# Patient Record
Sex: FEMALE | Race: WHITE | Employment: OTHER | URBAN - METROPOLITAN AREA
[De-identification: names, ages, dates, MRNs, and addresses within clinical notes are randomized per-mention and may not be internally consistent; named-entity substitution may affect disease eponyms.]

---

## 2017-03-22 ENCOUNTER — TRANSFERRED RECORDS (OUTPATIENT)
Dept: HEALTH INFORMATION MANAGEMENT | Facility: CLINIC | Age: 77
End: 2017-03-22

## 2017-03-30 ENCOUNTER — TRANSFERRED RECORDS (OUTPATIENT)
Dept: HEALTH INFORMATION MANAGEMENT | Facility: CLINIC | Age: 77
End: 2017-03-30

## 2017-06-01 ENCOUNTER — RADIANT APPOINTMENT (OUTPATIENT)
Dept: MAMMOGRAPHY | Facility: CLINIC | Age: 77
End: 2017-06-01
Payer: COMMERCIAL

## 2017-06-01 ENCOUNTER — OFFICE VISIT (OUTPATIENT)
Dept: OBGYN | Facility: CLINIC | Age: 77
End: 2017-06-01
Payer: COMMERCIAL

## 2017-06-01 VITALS
BODY MASS INDEX: 30.61 KG/M2 | SYSTOLIC BLOOD PRESSURE: 122 MMHG | DIASTOLIC BLOOD PRESSURE: 78 MMHG | WEIGHT: 195 LBS | HEIGHT: 67 IN

## 2017-06-01 DIAGNOSIS — Z01.419 ENCOUNTER FOR GYNECOLOGICAL EXAMINATION WITHOUT ABNORMAL FINDING: Primary | ICD-10-CM

## 2017-06-01 DIAGNOSIS — I82.4Y2 ACUTE DEEP VEIN THROMBOSIS (DVT) OF PROXIMAL VEIN OF LEFT LOWER EXTREMITY (H): ICD-10-CM

## 2017-06-01 DIAGNOSIS — Z85.3 PERSONAL HISTORY OF BREAST CANCER: ICD-10-CM

## 2017-06-01 DIAGNOSIS — Z12.31 VISIT FOR SCREENING MAMMOGRAM: ICD-10-CM

## 2017-06-01 DIAGNOSIS — N39.0 RECURRENT UTI: ICD-10-CM

## 2017-06-01 PROCEDURE — G0202 SCR MAMMO BI INCL CAD: HCPCS | Mod: TC

## 2017-06-01 PROCEDURE — 99397 PER PM REEVAL EST PAT 65+ YR: CPT | Performed by: OBSTETRICS & GYNECOLOGY

## 2017-06-01 PROCEDURE — 77063 BREAST TOMOSYNTHESIS BI: CPT | Mod: TC

## 2017-06-01 ASSESSMENT — PATIENT HEALTH QUESTIONNAIRE - PHQ9: 5. POOR APPETITE OR OVEREATING: NOT AT ALL

## 2017-06-01 ASSESSMENT — ANXIETY QUESTIONNAIRES
2. NOT BEING ABLE TO STOP OR CONTROL WORRYING: MORE THAN HALF THE DAYS
7. FEELING AFRAID AS IF SOMETHING AWFUL MIGHT HAPPEN: NOT AT ALL
IF YOU CHECKED OFF ANY PROBLEMS ON THIS QUESTIONNAIRE, HOW DIFFICULT HAVE THESE PROBLEMS MADE IT FOR YOU TO DO YOUR WORK, TAKE CARE OF THINGS AT HOME, OR GET ALONG WITH OTHER PEOPLE: NOT DIFFICULT AT ALL
5. BEING SO RESTLESS THAT IT IS HARD TO SIT STILL: NOT AT ALL
1. FEELING NERVOUS, ANXIOUS, OR ON EDGE: MORE THAN HALF THE DAYS
GAD7 TOTAL SCORE: 6
6. BECOMING EASILY ANNOYED OR IRRITABLE: NOT AT ALL
3. WORRYING TOO MUCH ABOUT DIFFERENT THINGS: MORE THAN HALF THE DAYS

## 2017-06-01 NOTE — MR AVS SNAPSHOT
"              After Visit Summary   6/1/2017    Jaky Banis    MRN: [id number]           Patient Information     Date Of Birth          1940        Visit Information        Provider Department      6/1/2017 1:30 PM Oskar Kendrick MD [facility]        Today's Diagnoses     Encounter for gynecological examination without abnormal finding    -  1    Recurrent UTI        Personal history of breast cancer        Acute deep vein thrombosis (DVT) of proximal vein of left lower extremity (H)           Follow-ups after your visit        Who to contact     If you have questions or need follow up information about today's clinic visit or your schedule please contact [facility] directly at [phone].  Normal or non-critical lab and imaging results will be communicated to you by MyChart, letter or phone within 4 business days after the clinic has received the results. If you do not hear from us within 7 days, please contact the clinic through [employer] or phone. If you have a critical or abnormal lab result, we will notify you by phone as soon as possible.  Submit refill requests through Qiniu or call your pharmacy and they will forward the refill request to us. Please allow 3 business days for your refill to be completed.          Additional Information About Your Visit        MyChart Information     Qiniu lets you send messages to your doctor, view your test results, renew your prescriptions, schedule appointments and more. To sign up, go to [email or web address] . Click on \"Log in\" on the left side of the screen, which will take you to the Welcome page. Then click on \"Sign up Now\" on the right side of the page.     You will be asked to enter the access code listed below, as well as some personal information. Please follow the directions to create your username and password.     Your access code is: [email or web address]  Expires: 8/30/2017  2:15 PM     Your access code " "will  in 90 days. If you need help or a new code, please call your [facility] clinic or [phone].        Care EveryWhere ID     This is your Care EveryWhere ID. This could be used by other organizations to access your [place] medical records  [id number]        Your Vitals Were     Height BMI (Body Mass Index)                5' 7\" (1.702 m) 30.54 kg/m2           Blood Pressure from Last 3 Encounters:   17 122/78   16 104/72   05/28/15 (!) 138/96    Weight from Last 3 Encounters:   17 195 lb (88.5 kg)   16 187 lb (84.8 kg)   05/28/15 197 lb (89.4 kg)              Today, you had the following     No orders found for display         Where to get your medicines      These medications were sent to [place] [id number] [place] [facility] [address] [facility]  [address] [facility] [address]     Phone:  [phone]     cephalexin 250 MG capsule          Primary Care Provider    None Specified       No primary provider on file.        Thank you!     Thank you for choosing [facility] [place]  for your care. Our goal is always to provide you with excellent care. Hearing back from our patients is one way we can continue to improve our services. Please take a few minutes to complete the written survey that you may receive in the mail after your visit with us. Thank you!             Your Updated Medication List - Protect others around you: Learn how to safely use, store and throw away your medicines at www.disposemymeds.org.          This list is accurate as of: 17  2:15 PM.  Always use your most recent med list.                   Brand Name Dispense Instructions for use    AMLODIPINE BESYLATE PO      Take 5 mg by mouth       aspirin 81 MG tablet      Take by mouth daily       ATARAX OR          CALCIUM PO          cephalexin 250 MG capsule    KEFLEX    90 capsule    Take one tablet by oral route daily       cetirizine 10 MG tablet    zyrTEC     Take 10 mg by mouth daily       " CRANBERRY PO          FLUTICASONE PROPIONATE (NASAL) NA          Lactobacillus Acidophilus Powd          levothyroxine 75 MCG tablet    SYNTHROID/LEVOTHROID     Take 75 mcg by mouth daily       NADOLOL PO          PANTOPRAZOLE SODIUM PO          SIMVASTATIN PO      Take 10 mg by mouth       venlafaxine 37.5 MG tablet    EFFEXOR     Take by mouth 2 times daily       VITAMIN D3 PO      Take by mouth daily       WARFARIN SODIUM PO      Take 5 mg by mouth

## 2017-06-01 NOTE — PROGRESS NOTES
Jaky is a 77 year old  female who presents for annual exam.     Besides routine health maintenance, she has no other health concerns today .    Do you have a Health Care Directive?: Yes: Advance Directive has been received and scanned.    Fall risk:        HPI:  The patient's PCP is Gabriella Mercado [place] .    Has recurrent DVT of left leg. On coumadin now.   No UTIs on Keflex daily.  Hx breast cancer. Mammo today    GYNECOLOGIC HISTORY:  No LMP recorded. Patient is postmenopausal..   reports that she has never smoked. She does not have any smokeless tobacco history on file.    Patient is not sexually active.  STD testing offered?  Declined  Last PHQ-9 score on record=   PHQ-9 SCORE 2017   Total Score 0     Last GAD7 score on record=   SOPHIA-7 SCORE 2016   Total Score 3 6     Alcohol Score = 1    HEALTH MAINTENANCE:  Cholesterol: 194   Total= 54, Triglycerides=99, YOU=204, OYV=LRG=809, TSH=3.03    Last Mammo: one year ago, Result: normal, Next Mammo: today   Pap: at age 66  DEXA:  2016  Colonoscopy:  3/1/2014, Result:  normal, Next Colonoscopy: no further follow up.    Health maintenance updated:  yes    HISTORY:  Obstetric History       T1      L1     SAB0   TAB0   Ectopic0   Multiple0   Live Births0       # Outcome Date GA Lbr Ruben/2nd Weight Sex Delivery Anes PTL Lv   2 Term            1 SAB                 Patient Active Problem List   Diagnosis     Personal history of breast cancer     DVT (deep venous thrombosis) (H)     Past Surgical History:   Procedure Laterality Date     APPENDECTOMY       BREAST LUMPECTOMY, RT/LT Right      CYSTOCELE REPAIR      with mesh     KNEE SURGERY       TONSILLECTOMY       WRIST SURGERY        Social History   Substance Use Topics     Smoking status: Never Smoker     Smokeless tobacco: Not on file     Alcohol use 0.0 oz/week     0 Standard drinks or equivalent per week      Problem (# of Occurrences) Relation (Name,Age of Onset)     "Breast Cancer (1) Mother    Hyperlipidemia (2) Mother, Sister    Hypertension (2) Mother, Sister    OSTEOPOROSIS (1) Mother            Current Outpatient Prescriptions   Medication Sig     WARFARIN SODIUM PO Take 5 mg by mouth     cephalexin (KEFLEX) 250 MG capsule Take one tablet by oral route daily     AMLODIPINE BESYLATE PO Take 5 mg by mouth     aspirin 81 MG tablet Take by mouth daily     HydrOXYzine HCl (ATARAX OR)      CALCIUM PO      cetirizine (ZYRTEC) 10 MG tablet Take 10 mg by mouth daily     Cholecalciferol (VITAMIN D3 PO) Take by mouth daily     CRANBERRY PO      FLUTICASONE PROPIONATE, NASAL, NA      Lactobacillus Acidophilus POWD      levothyroxine (SYNTHROID, LEVOTHROID) 75 MCG tablet Take 75 mcg by mouth daily     NADOLOL PO      PANTOPRAZOLE SODIUM PO      SIMVASTATIN PO Take 10 mg by mouth     venlafaxine (EFFEXOR) 37.5 MG tablet Take by mouth 2 times daily     No current facility-administered medications for this visit.        Allergies   Allergen Reactions     Amoxicillin      Cefprozil      Stomach.       Ciprofloxacin      Codeine      Morphine        Past medical, surgical, social and family history were reviewed and updated in EPIC.    ROS:   12 point review of systems negative other than symptoms noted below.  Psychiatric: Anxiety  Blood/Lymph: Easy Bleeding    EXAM:  /78  Ht 5' 7\" (1.702 m)  Wt 195 lb (88.5 kg)  BMI 30.54 kg/m2   BMI: Body mass index is 30.54 kg/(m^2).    EXAM:  Constitutional: Appearance: Well nourished, well developed alert, in no acute distress  Neck:  Lymph Nodes:  No lymphadenopathy present    Thyroid:  Gland size normal, nontender, no nodules or masses present  on palpation  Chest:  Respiratory Effort:  Breathing unlabored  Cardiovascular:Heart    Auscultation:  Regular rate, normal rhythm, no murmurs present  Breasts: Inspection of Breasts:  No lymphadenopathy present    Palpation of Breasts and Axillae:  No masses present on palpation, no  breast " tenderness    Axillary Lymph Nodes:  No lymphadenopathy present  Gastrointestinal:  Abdominal Examination:  Abdomen nontender to palpation, tone normal without     rigidity or guarding, no masses present, umbilicus without lesions    Liver and speen:  No hepatomegaly present, liver nontender to palpation    Hernias:  No hernias present  Lymphatic: Lymph Nodes:  No other lymphadenopathy present  Skin:  General Inspection:  No rashes present, no lesions present, no areas of  discoloration.    Genitalia and Groin:  No rashes present, no lesions present, no areas of  discoloration, no masses present  Neurologic/Psychiatric:    Mental Status:  Oriented X3     Pelvic Exam:  External Genitalia:     Normal appearance for age, no discharge present, no tenderness present, no inflammatory lesions present, color normal  Vagina:     Normal vaginal vault without central or paravaginal defects, ATROPHIC  Bladder:     Nontender to palpation  Urethra:   Urethral Body:  Urethra palpation normal, urethra structural support normal   Urethral Meatus:  No erythema or lesions present  Cervix:     Appearance healthy, no lesions present, nontender to palpation, no bleeding present  Uterus:     Nontender to palpation, no masses present, position anteflexed, mobility: normal  Adnexa:     No adnexal tenderness present, no adnexal masses present  Perineum:     Perineum within normal limits, no evidence of trauma, no rashes or skin lesions present  Inguinal Lymph Nodes:     No lymphadenopathy present      COUNSELING:   Reviewed preventive health counseling, as reflected in patient instructions       Regular exercise    BMI:  Body mass index is 30.54 kg/(m^2).  Weight management plan: Patient was referred to their PCP to discuss a diet and exercise plan.   reports that she has never smoked. She does not have any smokeless tobacco history on file.      ASSESSMENT:  77 year old female with satisfactory annual exam.    ICD-10-CM    1. Encounter for  gynecological examination without abnormal finding Z01.419    2. Recurrent UTI N39.0 cephalexin (KEFLEX) 250 MG capsule   3. Personal history of breast cancer Z85.3    4. Acute deep vein thrombosis (DVT) of proximal vein of left lower extremity (H) I82.4Y2        PLAN:  Refill antibiotics to prevent UTIs.     Oskar Kendrick MD

## 2017-06-02 ASSESSMENT — PATIENT HEALTH QUESTIONNAIRE - PHQ9: SUM OF ALL RESPONSES TO PHQ QUESTIONS 1-9: 0

## 2017-06-02 ASSESSMENT — ANXIETY QUESTIONNAIRES: GAD7 TOTAL SCORE: 6

## 2017-11-29 ENCOUNTER — TRANSFERRED RECORDS (OUTPATIENT)
Dept: HEALTH INFORMATION MANAGEMENT | Facility: CLINIC | Age: 77
End: 2017-11-29

## 2018-06-05 ENCOUNTER — OFFICE VISIT (OUTPATIENT)
Dept: OBGYN | Facility: CLINIC | Age: 78
End: 2018-06-05
Payer: COMMERCIAL

## 2018-06-05 ENCOUNTER — RADIANT APPOINTMENT (OUTPATIENT)
Dept: MAMMOGRAPHY | Facility: CLINIC | Age: 78
End: 2018-06-05
Payer: COMMERCIAL

## 2018-06-05 VITALS
HEART RATE: 76 BPM | SYSTOLIC BLOOD PRESSURE: 104 MMHG | WEIGHT: 184.6 LBS | DIASTOLIC BLOOD PRESSURE: 66 MMHG | BODY MASS INDEX: 28.97 KG/M2 | HEIGHT: 67 IN

## 2018-06-05 DIAGNOSIS — Z85.3 PERSONAL HISTORY OF BREAST CANCER: ICD-10-CM

## 2018-06-05 DIAGNOSIS — Z01.419 ENCOUNTER FOR GYNECOLOGICAL EXAMINATION WITHOUT ABNORMAL FINDING: Primary | ICD-10-CM

## 2018-06-05 DIAGNOSIS — N39.0 RECURRENT UTI: ICD-10-CM

## 2018-06-05 DIAGNOSIS — Z12.31 VISIT FOR SCREENING MAMMOGRAM: ICD-10-CM

## 2018-06-05 DIAGNOSIS — I82.4Y2 ACUTE DEEP VEIN THROMBOSIS (DVT) OF PROXIMAL VEIN OF LEFT LOWER EXTREMITY (H): ICD-10-CM

## 2018-06-05 PROCEDURE — 77067 SCR MAMMO BI INCL CAD: CPT | Mod: TC

## 2018-06-05 PROCEDURE — 77063 BREAST TOMOSYNTHESIS BI: CPT | Mod: TC

## 2018-06-05 PROCEDURE — 99397 PER PM REEVAL EST PAT 65+ YR: CPT | Performed by: OBSTETRICS & GYNECOLOGY

## 2018-06-05 ASSESSMENT — ANXIETY QUESTIONNAIRES
7. FEELING AFRAID AS IF SOMETHING AWFUL MIGHT HAPPEN: SEVERAL DAYS
3. WORRYING TOO MUCH ABOUT DIFFERENT THINGS: NEARLY EVERY DAY
6. BECOMING EASILY ANNOYED OR IRRITABLE: SEVERAL DAYS
5. BEING SO RESTLESS THAT IT IS HARD TO SIT STILL: NOT AT ALL
IF YOU CHECKED OFF ANY PROBLEMS ON THIS QUESTIONNAIRE, HOW DIFFICULT HAVE THESE PROBLEMS MADE IT FOR YOU TO DO YOUR WORK, TAKE CARE OF THINGS AT HOME, OR GET ALONG WITH OTHER PEOPLE: NOT DIFFICULT AT ALL
2. NOT BEING ABLE TO STOP OR CONTROL WORRYING: MORE THAN HALF THE DAYS
1. FEELING NERVOUS, ANXIOUS, OR ON EDGE: MORE THAN HALF THE DAYS
GAD7 TOTAL SCORE: 9

## 2018-06-05 ASSESSMENT — PATIENT HEALTH QUESTIONNAIRE - PHQ9: 5. POOR APPETITE OR OVEREATING: NOT AT ALL

## 2018-06-05 NOTE — PROGRESS NOTES
Jaky is a 78 year old  female who presents for annual exam.     Besides routine health maintenance,  she would like to discuss knee pain.    Do you have a Health Care Directive?: Yes: Patient states has Advance Directive and will bring in a copy to clinic.    Fall risk:   Fallen 2 or more times in the past year?: No  Any fall with injury in the past year?: No    HPI:  The patient's PCP is  LORENA COMBS.    Having pain from knee arthritis. Needs replacement. Scared about altering anticoagulation and hx of PE.  Had one UTI on daily Keflex. Also had small stones.     GYNECOLOGIC HISTORY:  No LMP recorded. Patient is postmenopausal..   reports that she has never smoked. She has never used smokeless tobacco.    Patient is not sexually active.  STD testing offered?  Declined  Last PHQ-9 score on record=   PHQ-9 SCORE 2018   Total Score 0     Last GAD7 score on record=   SOPHIA-7 SCORE 2016   Total Score 3 6 9     Alcohol Score = 2    HEALTH MAINTENANCE:  Cholesterol: (No results found for: CHOL Will do with PMD  Last Mammo: one year ago, Result: normal, Next Mammo: today   Pap: (No results found for: PAP )  DEXA:  2016  Colonoscopy:  3/01/2014, Result:  normal, Next Colonoscopy: 10 years.    Health maintenance updated:  yes    HISTORY:  Obstetric History       T1      L1     SAB1   TAB0   Ectopic0   Multiple0   Live Births0       # Outcome Date GA Lbr Ruben/2nd Weight Sex Delivery Anes PTL Lv   2 Term            1 SAB                 Patient Active Problem List   Diagnosis     Personal history of breast cancer     DVT (deep venous thrombosis) (H)     Past Surgical History:   Procedure Laterality Date     APPENDECTOMY       BREAST LUMPECTOMY, RT/LT Right      CYSTOCELE REPAIR      with mesh     KNEE SURGERY       TONSILLECTOMY       WRIST SURGERY        Social History   Substance Use Topics     Smoking status: Never Smoker     Smokeless tobacco: Never Used      "Alcohol use 0.0 oz/week     0 Standard drinks or equivalent per week      Problem (# of Occurrences) Relation (Name,Age of Onset)    Breast Cancer (1) Mother    Hyperlipidemia (2) Mother, Sister    Hypertension (2) Mother, Sister    OSTEOPOROSIS (1) Mother            Current Outpatient Prescriptions   Medication Sig     cephalexin (KEFLEX) 250 MG capsule Take one tablet by oral route daily     cetirizine (ZYRTEC) 10 MG tablet Take 10 mg by mouth daily     Cholecalciferol (VITAMIN D3 PO) Take by mouth daily     FLUTICASONE PROPIONATE, NASAL, NA      HydrOXYzine HCl (ATARAX OR)      Lactobacillus Acidophilus POWD      levothyroxine (SYNTHROID, LEVOTHROID) 75 MCG tablet Take 75 mcg by mouth daily     NADOLOL PO      PANTOPRAZOLE SODIUM PO      SIMVASTATIN PO Take 10 mg by mouth     tamsulosin (FLOMAX) 0.4 MG capsule Take 0.4 mg by mouth     venlafaxine (EFFEXOR) 37.5 MG tablet Take by mouth 2 times daily     WARFARIN SODIUM PO Take 5 mg by mouth     AMLODIPINE BESYLATE PO Take 5 mg by mouth     No current facility-administered medications for this visit.        Allergies   Allergen Reactions     Amoxicillin      Cefadroxil      Cefprozil      Stomach.       Ciprofloxacin      Codeine      Erythromycin Swelling     Influenza Vaccines      Morphine        Past medical, surgical, social and family history were reviewed and updated in EPIC.    ROS:   12 point review of systems negative other than symptoms noted below.  Genitourinary: Vaginal Dryness  Skin: Skin Dryness  Musculoskeletal: Joint Pain  Endocrine: Decreased Libido  Psychiatric: Anxiety  Blood/Lymph: Easy Bleeding    EXAM:  /66  Pulse 76  Ht 5' 7\" (1.702 m)  Wt 184 lb 9.6 oz (83.7 kg)  BMI 28.91 kg/m2   BMI: Body mass index is 28.91 kg/(m^2).    EXAM:  Constitutional: Appearance: Well nourished, well developed alert, in no acute distress  Neck:  Lymph Nodes:  No lymphadenopathy present    Thyroid:  Gland size normal, nontender, no nodules or masses " present  on palpation  Chest:  Respiratory Effort:  Breathing unlabored  Cardiovascular:Heart    Auscultation:  Regular rate, normal rhythm, no murmurs present  Breasts: Inspection of Breasts:  No lymphadenopathy present., Palpation of Breasts and Axillae:  No masses present on palpation, no breast tenderness., Axillary Lymph Nodes:  No lymphadenopathy present. and No nodularity, asymmetry or nipple discharge bilaterally.  Gastrointestinal:  Abdominal Examination:  Abdomen nontender to palpation, tone normal without     rigidity or guarding, no masses present, umbilicus without lesions    Liver and speen:  No hepatomegaly present, liver nontender to palpation    Hernias:  No hernias present  Lymphatic: Lymph Nodes:  No other lymphadenopathy present  Skin:  General Inspection:  No rashes present, no lesions present, no areas of  discoloration.    Genitalia and Groin:  No rashes present, no lesions present, no areas of  discoloration, no masses present  Neurologic/Psychiatric:    Mental Status:  Oriented X3     Pelvic Exam:  External Genitalia:     Normal appearance for age, no discharge present, no tenderness present, no inflammatory lesions present, color normal  Vagina:     Normal vaginal vault without central or paravaginal defects, ATROPHIC  Bladder:     Nontender to palpation  Urethra:   Urethral Body:  Urethra palpation normal, urethra structural support normal   Urethral Meatus:  No erythema or lesions present  Cervix:     Appearance healthy, no lesions present, nontender to palpation, no bleeding present  Uterus:     Nontender to palpation, no masses present, position anteflexed, mobility: normal  Adnexa:     No adnexal tenderness present, no adnexal masses present  Perineum:     Perineum within normal limits, no evidence of trauma, no rashes or skin lesions present  Inguinal Lymph Nodes:     No lymphadenopathy present      COUNSELING:   Reviewed preventive health counseling, as reflected in patient  instructions       Regular exercise    BMI:  Body mass index is 28.91 kg/(m^2).  Weight management plan: Patient was referred to their PCP to discuss a diet and exercise plan.   reports that she has never smoked. She has never used smokeless tobacco.      ASSESSMENT:  78 year old female with satisfactory annual exam.    ICD-10-CM    1. Encounter for gynecological examination without abnormal finding Z01.419    2. Recurrent UTI N39.0 cephalexin (KEFLEX) 250 MG capsule   3. Personal history of breast cancer Z85.3    4. Acute deep vein thrombosis (DVT) of proximal vein of left lower extremity (H) I82.4Y2        PLAN:  Continue Keflex.   Discussed protocol for stopping coumadin and then surgery. Early ambulation.      Oskar Kendrick MD

## 2018-06-05 NOTE — MR AVS SNAPSHOT
"              After Visit Summary   6/5/2018    Jaky Bains    MRN: [id number]           Patient Information     Date Of Birth          1940        Visit Information        Provider Department      6/5/2018 1:30 PM Oskar Kendrick MD [facility]        Today's Diagnoses     Encounter for gynecological examination without abnormal finding    -  1    Recurrent UTI        Personal history of breast cancer        Acute deep vein thrombosis (DVT) of proximal vein of left lower extremity (H)           Follow-ups after your visit        Who to contact     If you have questions or need follow up information about today's clinic visit or your schedule please contact [facility] directly at [phone].  Normal or non-critical lab and imaging results will be communicated to you by MyChart, letter or phone within 4 business days after the clinic has received the results. If you do not hear from us within 7 days, please contact the clinic through MyChart or phone. If you have a critical or abnormal lab result, we will notify you by phone as soon as possible.  Submit refill requests through [employer] or call your pharmacy and they will forward the refill request to us. Please allow 3 business days for your refill to be completed.          Additional Information About Your Visit        Care EveryWhere ID     This is your Care EveryWhere ID. This could be used by other organizations to access your [place] medical records  [id number]        Your Vitals Were     Pulse Height BMI (Body Mass Index)             76 5' 7\" (1.702 m) 28.91 kg/m2          Blood Pressure from Last 3 Encounters:   06/05/18 104/66   06/01/17 122/78   05/31/16 104/72    Weight from Last 3 Encounters:   06/05/18 184 lb 9.6 oz (83.7 kg)   06/01/17 195 lb (88.5 kg)   05/31/16 187 lb (84.8 kg)              Today, you had the following     No orders found for display         Today's Medication Changes        "   These changes are accurate as of 6/5/18  2:10 PM.  If you have any questions, ask your nurse or doctor.               Stop taking these medicines if you haven't already. Please contact your care team if you have questions.     aspirin 81 MG tablet   Stopped by:  Oskar Kendrick MD                Where to get your medicines      These medications were sent to [place] [id number] [place] [facility] [address] [facility]  [address] [facility] [address]     Phone:  [phone]     cephalexin 250 MG capsule                Primary Care Provider Office Phone # Fax #    Leroy Hylton [phone]       [facility] [address] [facility] [address]        Equal Access to Services     VISHNU RICE : Hadii aad ku hadasho Sorazia, waaxda luqadaha, qaybta kaalmada adeegyada, waxbart terrell. So [facility] [phone].    ATENCIÓN: Si habla español, tiene a rashid disposición servicios gratuitos de asistencia lingüística. [facility] [phone].    We comply with applicable federal civil rights laws and [place] laws. We do not discriminate on the basis of race, color, national origin, age, disability, sex, sexual orientation, or gender identity.            Thank you!     Thank you for choosing [facility]  for your care. Our goal is always to provide you with excellent care. Hearing back from our patients is one way we can continue to improve our services. Please take a few minutes to complete the written survey that you may receive in the mail after your visit with us. Thank you!             Your Updated Medication List - Protect others around you: Learn how to safely use, store and throw away your medicines at www.disposemymeds.org.          This list is accurate as of 6/5/18  2:10 PM.  Always use your most recent med list.                   Brand Name Dispense Instructions for use Diagnosis    AMLODIPINE BESYLATE PO      Take 5 mg by mouth         ATARAX OR           cephalexin 250 MG capsule    KEFLEX    90 capsule    Take one tablet by oral route daily    Recurrent UTI       cetirizine 10 MG tablet    zyrTEC     Take 10 mg by mouth daily        FLUTICASONE PROPIONATE (NASAL) NA           Lactobacillus Acidophilus Powd           levothyroxine 75 MCG tablet    SYNTHROID/LEVOTHROID     Take 75 mcg by mouth daily        NADOLOL PO           PANTOPRAZOLE SODIUM PO           SIMVASTATIN PO      Take 10 mg by mouth        tamsulosin 0.4 MG capsule    FLOMAX     Take 0.4 mg by mouth        venlafaxine 37.5 MG tablet    EFFEXOR     Take by mouth 2 times daily        VITAMIN D3 PO      Take by mouth daily        WARFARIN SODIUM PO      Take 5 mg by mouth

## 2018-06-06 ASSESSMENT — PATIENT HEALTH QUESTIONNAIRE - PHQ9: SUM OF ALL RESPONSES TO PHQ QUESTIONS 1-9: 0

## 2018-06-06 ASSESSMENT — ANXIETY QUESTIONNAIRES: GAD7 TOTAL SCORE: 9

## 2018-06-28 ENCOUNTER — TRANSFERRED RECORDS (OUTPATIENT)
Dept: HEALTH INFORMATION MANAGEMENT | Facility: CLINIC | Age: 78
End: 2018-06-28

## 2019-03-01 ENCOUNTER — OFFICE VISIT (OUTPATIENT)
Dept: OBGYN | Facility: CLINIC | Age: 79
End: 2019-03-01
Payer: COMMERCIAL

## 2019-03-01 VITALS
DIASTOLIC BLOOD PRESSURE: 74 MMHG | SYSTOLIC BLOOD PRESSURE: 112 MMHG | WEIGHT: 182 LBS | HEIGHT: 67 IN | BODY MASS INDEX: 28.56 KG/M2

## 2019-03-01 DIAGNOSIS — Z85.3 PERSONAL HISTORY OF BREAST CANCER: ICD-10-CM

## 2019-03-01 DIAGNOSIS — K43.9 HERNIA OF ABDOMINAL WALL: Primary | ICD-10-CM

## 2019-03-01 PROCEDURE — 99213 OFFICE O/P EST LOW 20 MIN: CPT | Performed by: OBSTETRICS & GYNECOLOGY

## 2019-03-01 RX ORDER — RIVAROXABAN 20 MG/1
TABLET, FILM COATED ORAL
Refills: 4 | COMMUNITY
Start: 2018-12-17

## 2019-03-01 ASSESSMENT — MIFFLIN-ST. JEOR: SCORE: 1343.18

## 2019-03-01 NOTE — PROGRESS NOTES
SUBJECTIVE:                                                   Jaky Bains is a 78 year old female who presents to clinic today for the following health issue(s):  Patient presents with:  Consult: patient saw a urolgist and was told she has a tumor and another doctor told it was hernia. She is here to get a third opinion.      HPI:  Had CT in 3/18 and in . Both CTs show no significant findings. The 2017 CT shows fat filled hernia but does not say where. The 2018 does not mention the hernia at all.   Pt saw urology for routine f/u. He said CT from 2018 showed a tumor and not a hernia. He said she could follow it.   Pt has hx of breast cancer and concerned.   Here for help in interpreting exams. No images are available.  Pt is asymptomatic from stones or hernia    No LMP recorded. Patient is postmenopausal..   Patient is sexually active, .   reports that  has never smoked. she has never used smokeless tobacco.    STD testing offered?  Declined    Health maintenance updated:  yes    Today's PHQ-2 Score: No flowsheet data found.  Today's PHQ-9 Score:   PHQ-9 SCORE 2018   PHQ-9 Total Score 0     Today's SOPHIA-7 Score:   SOPHIA-7 SCORE 2018   Total Score 9       Problem list and histories reviewed & adjusted, as indicated.  Additional history: as documented.    Patient Active Problem List   Diagnosis     Personal history of breast cancer     DVT (deep venous thrombosis) (H)     Past Surgical History:   Procedure Laterality Date     APPENDECTOMY       BREAST LUMPECTOMY, RT/LT Right      CYSTOCELE REPAIR      with mesh     KNEE SURGERY       TONSILLECTOMY       WRIST SURGERY        Social History     Tobacco Use     Smoking status: Never Smoker     Smokeless tobacco: Never Used   Substance Use Topics     Alcohol use: Yes     Alcohol/week: 0.0 oz      Problem (# of Occurrences) Relation (Name,Age of Onset)    Breast Cancer (1) Mother    Hyperlipidemia (2) Mother, Sister    Hypertension (2) Mother,  "Sister    Osteoporosis (1) Mother            Current Outpatient Medications   Medication Sig     AMLODIPINE BESYLATE PO Take 5 mg by mouth     cephalexin (KEFLEX) 250 MG capsule Take one tablet by oral route daily     cetirizine (ZYRTEC) 10 MG tablet Take 10 mg by mouth daily     Cholecalciferol (VITAMIN D3 PO) Take by mouth daily     FLUTICASONE PROPIONATE, NASAL, NA      HydrOXYzine HCl (ATARAX OR)      Lactobacillus Acidophilus POWD      levothyroxine (SYNTHROID, LEVOTHROID) 75 MCG tablet Take 75 mcg by mouth daily     PANTOPRAZOLE SODIUM PO      SIMVASTATIN PO Take 10 mg by mouth     tamsulosin (FLOMAX) 0.4 MG capsule Take 0.4 mg by mouth     venlafaxine (EFFEXOR) 37.5 MG tablet Take by mouth 2 times daily     XARELTO 20 MG TABS tablet TAKE 1 TAB BY MOUTH EVERY MORNING WITH BREAKFAST.     No current facility-administered medications for this visit.      Allergies   Allergen Reactions     Amoxicillin      Cefadroxil      Cefprozil      Stomach.       Ciprofloxacin      Codeine      Erythromycin Swelling     Influenza Vaccines      Morphine        ROS:  12 point review of systems negative other than symptoms noted below.  Head: Nasal Congestion  Genitourinary: Vaginal Dryness  Musculoskeletal: Joint Pain  Psychiatric: Anxiety  Blood/Lymph: Easy Bleeding    OBJECTIVE:     /74   Ht 1.702 m (5' 7\")   Wt 82.6 kg (182 lb)   BMI 28.51 kg/m    Body mass index is 28.51 kg/m .    Exam:  Constitutional:  Appearance: Well nourished, well developed alert, in no acute distress  Neurologic/Psychiatric:  Mental Status:  Oriented X3      In-Clinic Test Results:  No results found for this or any previous visit (from the past 24 hour(s)).    ASSESSMENT/PLAN:                                                        ICD-10-CM    1. Hernia of abdominal wall K43.9    2. Personal history of breast cancer Z85.3          Reviewed CT reports and Urology outpatient notes. There is no mention of tumor by the urologist and 2018 CT has " no mention of tumor or hernia.   I discussed hernia and if not filled with bowel in 2018 would not show up on imaging. Since there is no mention of tumor, mass or adenopathy, I can only think pt mis-heard the urologist.  She will call hospital and see if radiology can confirm hernia not present in 2018 exam    Oskar Kendrick MD  [facility]

## 2019-06-29 DIAGNOSIS — N39.0 RECURRENT UTI: ICD-10-CM

## 2019-07-01 NOTE — TELEPHONE ENCOUNTER
Pres[place] per [facility] [place].      PLAN:  Continue Keflex.

## 2019-07-01 NOTE — TELEPHONE ENCOUNTER
Requested Prescriptions   Pending Prescriptions Disp Refills     cephALEXin (KEFLEX) 250 MG capsule [Pharmacy Med Name: CEPHALEXIN 250 MG CAPSULE] 90 capsule 3     Sig: TAKE ONE CAPSULE BY MOUTH ONCE DAILY       There is no refill protocol information for this order        Last Written Prescription Date:  6/5/18  Last Fill Quantity: 90,  # refills: 3   Last office visit: 3/1/2019 with prescribing provider:   Dr. Kendrick     Future Office Visit:   Next 5 appointments (look out 90 days)    Jul 11, 2019 11:20 AM CDT  PHYSICAL with Oskar Kendrick MD  [facility] ([facility] [address] [id number] [phone]

## 2019-07-10 NOTE — PROGRESS NOTES
Jaky is a 79 year old  female who presents for annual exam.     Besides routine health maintenance, she has no other health concerns today .    Do you have a Health Care Directive?: Yes: Patient states has Advance Directive and will bring in a copy to clinic.    Fall risk:   Fallen 2 or more times in the past year?: Yes  Any fall with injury in the past year?: Yes    HPI:  The patient's PCP is LORENA COMBS.    Having fasting labs here today. Will give to PCP.  Had gross hematuria over . UA showed blood. Treated for UTI but culture not sent.   Never had symptoms.  On daily sulfa antibiotic for UTI prophylaxis. Had 2 UTI this past year.   Has new urologist. Happy.    GYNECOLOGIC HISTORY:  No LMP recorded. Patient is postmenopausal..   reports that she has never smoked. She has never used smokeless tobacco.    Patient is not sexually active.  STD testing offered?  Declined  Last PHQ-9 score on record=   PHQ-9 SCORE 2019   PHQ-9 Total Score 0     Last GAD7 score on record=   SOPHIA-7 SCORE 2017   Total Score 6 9 3     Alcohol Score = 2    HEALTH MAINTENANCE:  Cholesterol: 18   Total= 171, Triglycerides=107, HDL=54, LDL=96    Last Mammo: 18, Result: benign, Next Mammo: today   Pap: Pap not needed anymore.  DEXA:  16  Colonoscopy: 2019 @ [facility] in [place], Result:  normal, Next Colonoscopy: .    Health maintenance updated:  yes    HISTORY:  OB History    Para Term  AB Living   2 1 1 0 1 1   SAB TAB Ectopic Multiple Live Births   1 0 0 0 0      # Outcome Date GA Lbr Ruben/2nd Weight Sex Delivery Anes PTL Lv   2 Term            1 SAB              Patient Active Problem List   Diagnosis     Personal history of breast cancer     DVT (deep venous thrombosis) (H)     Past Surgical History:   Procedure Laterality Date     APPENDECTOMY       BREAST LUMPECTOMY, RT/LT Right      CYSTOCELE REPAIR      with mesh     KNEE SURGERY        TONSILLECTOMY       WRIST SURGERY        Social History     Tobacco Use     Smoking status: Never Smoker     Smokeless tobacco: Never Used   Substance Use Topics     Alcohol use: Yes     Alcohol/week: 0.0 oz      Problem (# of Occurrences) Relation (Name,Age of Onset)    Breast Cancer (1) Mother    Hyperlipidemia (2) Mother, Sister    Hypertension (2) Mother, Sister    Osteoporosis (1) Mother            Current Outpatient Medications   Medication Sig     AMLODIPINE BESYLATE PO Take 5 mg by mouth     cetirizine (ZYRTEC) 10 MG tablet Take 10 mg by mouth daily     Cholecalciferol (VITAMIN D3 PO) Take by mouth daily     FLUTICASONE PROPIONATE, NASAL, NA      HydrOXYzine HCl (ATARAX OR)      Lactobacillus Acidophilus POWD      levothyroxine (SYNTHROID, LEVOTHROID) 75 MCG tablet Take 75 mcg by mouth daily     metoprolol succinate ER (TOPROL-XL) 25 MG 24 hr tablet Take 25 mg by mouth     Polyethyl Glycol-Propyl Glycol (SYSTANE OP)      SIMVASTATIN PO Take 10 mg by mouth     sulfamethoxazole-trimethoprim (BACTRIM DS/SEPTRA DS) 800-160 MG tablet Take 1 tablet by mouth daily     tamsulosin (FLOMAX) 0.4 MG capsule Take 0.4 mg by mouth     venlafaxine (EFFEXOR) 37.5 MG tablet Take by mouth 2 times daily     VITAMIN D, ERGOCALCIFEROL, PO      XARELTO 20 MG TABS tablet TAKE 1 TAB BY MOUTH EVERY MORNING WITH BREAKFAST.     cephALEXin (KEFLEX) 250 MG capsule TAKE ONE CAPSULE BY MOUTH ONCE DAILY (Patient not taking: Reported on 7/11/2019)     PANTOPRAZOLE SODIUM PO      No current facility-administered medications for this visit.        Allergies   Allergen Reactions     Cefprozil      Stomach.    Other reaction(s): GI intolerance, Intolerance  GI       Ciprofloxacin      Other reaction(s): GI intolerance, Intolerance  GI       Cefadroxil      Codeine Nausea     Other reaction(s): Rash  hallucinations       Erythromycin Swelling and Itching     Other reaction(s): Eye Irritation  Eyes swelled while use the liz       Influenza  "Vaccine Live Hives     Influenza Vaccines      Morphine Nausea and Vomiting     Amoxicillin Rash     Other reaction(s): Rash       Past medical, surgical, social and family history were reviewed and updated in EPIC.    ROS:   12 point review of systems negative other than symptoms noted above    EXAM:  /78 (BP Location: Right arm, Patient Position: Sitting, Cuff Size: Adult Regular)   Pulse 76   Ht 1.715 m (5' 7.5\")   Wt 79 kg (174 lb 3.2 oz)   Breastfeeding? No   BMI 26.88 kg/m     BMI: Body mass index is 26.88 kg/m .    EXAM:  Constitutional: Appearance: Well nourished, well developed alert, in no acute distress  Neck:  Lymph Nodes:  No lymphadenopathy present    Thyroid:  Gland size normal, nontender, no nodules or masses present  on palpation  Chest:  Respiratory Effort:  Breathing unlabored  Cardiovascular:Heart    Auscultation:  Regular rate, normal rhythm, no murmurs present  Breasts: Inspection of Breasts:  No lymphadenopathy present., Palpation of Breasts and Axillae:  No masses present on palpation, no breast tenderness., Axillary Lymph Nodes:  No lymphadenopathy present. and No nodularity, asymmetry or nipple discharge bilaterally.  Gastrointestinal:  Abdominal Examination:  Abdomen nontender to palpation, tone normal without     rigidity or guarding, no masses present, umbilicus without lesions    Liver and speen:  No hepatomegaly present, liver nontender to palpation    Hernias:  No hernias present  Lymphatic: Lymph Nodes:  No other lymphadenopathy present  Skin:  General Inspection:  No rashes present, no lesions present, no areas of  discoloration.    Genitalia and Groin:  No rashes present, no lesions present, no areas of  discoloration, no masses present  Neurologic/Psychiatric:    Mental Status:  Oriented X3     Pelvic Exam:  External Genitalia:     Normal appearance for age, no discharge present, no tenderness present, no inflammatory lesions present, color normal  Vagina:     Normal " vaginal vault without central or paravaginal defects, ATROPHIC  Bladder:     Nontender to palpation  Urethra:   Urethral Body:  Urethra palpation normal, urethra structural support normal   Urethral Meatus:  No erythema or lesions present  Cervix:     Appearance healthy, no lesions present, nontender to palpation, no bleeding present  Uterus:     Nontender to palpation, no masses present, position anteflexed, mobility: normal  Adnexa:     No adnexal tenderness present, no adnexal masses present  Perineum:     Perineum within normal limits, no evidence of trauma, no rashes or skin lesions present  Inguinal Lymph Nodes:     No lymphadenopathy present      COUNSELING:   Reviewed preventive health counseling, as reflected in patient instructions       Regular exercise    BMI:  Body mass index is 26.88 kg/m .     reports that she has never smoked. She has never used smokeless tobacco.     Results for orders placed or performed in visit on 07/11/19   UA with Microscopic   Result Value Ref Range    Color Urine Yellow     Appearance Urine Clear     Glucose Urine Negative NEG^Negative mg/dL    Bilirubin Urine Negative NEG^Negative    Ketones Urine Negative NEG^Negative mg/dL    Specific Gravity Urine 1.015 1.003 - 1.035    pH Urine 6.0 5.0 - 7.0 pH    Protein Albumin Urine Negative NEG^Negative mg/dL    Urobilinogen Urine 0.2 0.2 - 1.0 EU/dL    Nitrite Urine Negative NEG^Negative    Blood Urine Trace (A) NEG^Negative    Leukocyte Esterase Urine Trace (A) NEG^Negative    Source Midstream Urine     WBC Urine 5-10 (A) OTO5^0 - 5 /HPF    RBC Urine O - 2 OTO2^O - 2 /HPF    Bacteria Urine Few (A) NEG^Negative /HPF           ASSESSMENT:  79 year old female with satisfactory annual exam.    ICD-10-CM    1. Encounter for gynecological examination without abnormal finding Z01.419    2. Hematuria, unspecified type R31.9 UA with Microscopic   3. Pure hypercholesterolemia E78.00 Comprehensive metabolic panel     Lipid Profile   4.  Hypothyroidism, unspecified type E03.9 TSH     T4 free   5. Recurrent UTI N39.0 sulfamethoxazole-trimethoprim (BACTRIM DS/SEPTRA DS) 800-160 MG tablet     Urine Culture Aerobic Bacterial   6. Personal history of breast cancer Z85.3    7. Acute deep vein thrombosis (DVT) of proximal vein of left lower extremity (H) I82.4Y2        PLAN:  Will culture urine due to WBC. No hematuria. Has hx of kidney stones and is anticoagulated.   Refill Septra DS daily.  Fasting labs today    Oskar Kendrick MD

## 2019-07-11 ENCOUNTER — OFFICE VISIT (OUTPATIENT)
Dept: OBGYN | Facility: CLINIC | Age: 79
End: 2019-07-11
Attending: OBSTETRICS & GYNECOLOGY
Payer: COMMERCIAL

## 2019-07-11 ENCOUNTER — ANCILLARY PROCEDURE (OUTPATIENT)
Dept: MAMMOGRAPHY | Facility: CLINIC | Age: 79
End: 2019-07-11
Attending: OBSTETRICS & GYNECOLOGY
Payer: COMMERCIAL

## 2019-07-11 VITALS
HEART RATE: 76 BPM | DIASTOLIC BLOOD PRESSURE: 78 MMHG | SYSTOLIC BLOOD PRESSURE: 140 MMHG | BODY MASS INDEX: 26.4 KG/M2 | WEIGHT: 174.2 LBS | HEIGHT: 68 IN

## 2019-07-11 DIAGNOSIS — N39.0 RECURRENT UTI: ICD-10-CM

## 2019-07-11 DIAGNOSIS — E03.9 HYPOTHYROIDISM, UNSPECIFIED TYPE: ICD-10-CM

## 2019-07-11 DIAGNOSIS — Z12.31 VISIT FOR SCREENING MAMMOGRAM: ICD-10-CM

## 2019-07-11 DIAGNOSIS — I82.4Y2 ACUTE DEEP VEIN THROMBOSIS (DVT) OF PROXIMAL VEIN OF LEFT LOWER EXTREMITY (H): ICD-10-CM

## 2019-07-11 DIAGNOSIS — Z01.419 ENCOUNTER FOR GYNECOLOGICAL EXAMINATION WITHOUT ABNORMAL FINDING: Primary | ICD-10-CM

## 2019-07-11 DIAGNOSIS — R31.9 HEMATURIA, UNSPECIFIED TYPE: ICD-10-CM

## 2019-07-11 DIAGNOSIS — E78.00 PURE HYPERCHOLESTEROLEMIA: ICD-10-CM

## 2019-07-11 DIAGNOSIS — Z85.3 PERSONAL HISTORY OF BREAST CANCER: ICD-10-CM

## 2019-07-11 LAB
ALBUMIN UR-MCNC: NEGATIVE MG/DL
APPEARANCE UR: CLEAR
BACTERIA #/AREA URNS HPF: ABNORMAL /HPF
BILIRUB UR QL STRIP: NEGATIVE
COLOR UR AUTO: YELLOW
GLUCOSE UR STRIP-MCNC: NEGATIVE MG/DL
HGB UR QL STRIP: ABNORMAL
KETONES UR STRIP-MCNC: NEGATIVE MG/DL
LEUKOCYTE ESTERASE UR QL STRIP: ABNORMAL
NITRATE UR QL: NEGATIVE
PH UR STRIP: 6 PH (ref 5–7)
RBC #/AREA URNS AUTO: ABNORMAL /HPF
SOURCE: ABNORMAL
SP GR UR STRIP: 1.01 (ref 1–1.03)
UROBILINOGEN UR STRIP-ACNC: 0.2 EU/DL (ref 0.2–1)
WBC #/AREA URNS AUTO: ABNORMAL /HPF

## 2019-07-11 PROCEDURE — 80053 COMPREHEN METABOLIC PANEL: CPT | Performed by: OBSTETRICS & GYNECOLOGY

## 2019-07-11 PROCEDURE — 87086 URINE CULTURE/COLONY COUNT: CPT | Performed by: OBSTETRICS & GYNECOLOGY

## 2019-07-11 PROCEDURE — 81001 URINALYSIS AUTO W/SCOPE: CPT | Performed by: OBSTETRICS & GYNECOLOGY

## 2019-07-11 PROCEDURE — 80061 LIPID PANEL: CPT | Performed by: OBSTETRICS & GYNECOLOGY

## 2019-07-11 PROCEDURE — 36415 COLL VENOUS BLD VENIPUNCTURE: CPT | Performed by: OBSTETRICS & GYNECOLOGY

## 2019-07-11 PROCEDURE — 99397 PER PM REEVAL EST PAT 65+ YR: CPT | Performed by: OBSTETRICS & GYNECOLOGY

## 2019-07-11 PROCEDURE — 84439 ASSAY OF FREE THYROXINE: CPT | Performed by: OBSTETRICS & GYNECOLOGY

## 2019-07-11 PROCEDURE — 77067 SCR MAMMO BI INCL CAD: CPT | Mod: TC

## 2019-07-11 PROCEDURE — 84443 ASSAY THYROID STIM HORMONE: CPT | Performed by: OBSTETRICS & GYNECOLOGY

## 2019-07-11 PROCEDURE — 77063 BREAST TOMOSYNTHESIS BI: CPT | Mod: TC

## 2019-07-11 RX ORDER — SULFAMETHOXAZOLE/TRIMETHOPRIM 800-160 MG
1 TABLET ORAL
COMMUNITY
Start: 2019-07-06 | End: 2019-07-11

## 2019-07-11 RX ORDER — METOPROLOL SUCCINATE 25 MG/1
25 TABLET, EXTENDED RELEASE ORAL
COMMUNITY
Start: 2018-12-06

## 2019-07-11 RX ORDER — SULFAMETHOXAZOLE/TRIMETHOPRIM 800-160 MG
1 TABLET ORAL DAILY
Qty: 90 TABLET | Refills: 3 | Status: SHIPPED | OUTPATIENT
Start: 2019-07-11 | End: 2020-08-20

## 2019-07-11 ASSESSMENT — PATIENT HEALTH QUESTIONNAIRE - PHQ9
SUM OF ALL RESPONSES TO PHQ QUESTIONS 1-9: 0
5. POOR APPETITE OR OVEREATING: NOT AT ALL

## 2019-07-11 ASSESSMENT — ANXIETY QUESTIONNAIRES
GAD7 TOTAL SCORE: 3
5. BEING SO RESTLESS THAT IT IS HARD TO SIT STILL: NOT AT ALL
IF YOU CHECKED OFF ANY PROBLEMS ON THIS QUESTIONNAIRE, HOW DIFFICULT HAVE THESE PROBLEMS MADE IT FOR YOU TO DO YOUR WORK, TAKE CARE OF THINGS AT HOME, OR GET ALONG WITH OTHER PEOPLE: NOT DIFFICULT AT ALL
2. NOT BEING ABLE TO STOP OR CONTROL WORRYING: SEVERAL DAYS
7. FEELING AFRAID AS IF SOMETHING AWFUL MIGHT HAPPEN: NOT AT ALL
6. BECOMING EASILY ANNOYED OR IRRITABLE: NOT AT ALL
3. WORRYING TOO MUCH ABOUT DIFFERENT THINGS: SEVERAL DAYS
1. FEELING NERVOUS, ANXIOUS, OR ON EDGE: SEVERAL DAYS

## 2019-07-11 ASSESSMENT — MIFFLIN-ST. JEOR: SCORE: 1310.73

## 2019-07-12 ENCOUNTER — TELEPHONE (OUTPATIENT)
Dept: OBGYN | Facility: CLINIC | Age: 79
End: 2019-07-12

## 2019-07-12 LAB
ALBUMIN SERPL-MCNC: 3.8 G/DL (ref 3.4–5)
ALP SERPL-CCNC: 92 U/L (ref 40–150)
ALT SERPL W P-5'-P-CCNC: 22 U/L (ref 0–50)
ANION GAP SERPL CALCULATED.3IONS-SCNC: 7 MMOL/L (ref 3–14)
AST SERPL W P-5'-P-CCNC: 15 U/L (ref 0–45)
BACTERIA SPEC CULT: NO GROWTH
BILIRUB SERPL-MCNC: 0.5 MG/DL (ref 0.2–1.3)
BUN SERPL-MCNC: 12 MG/DL (ref 7–30)
CALCIUM SERPL-MCNC: 9.9 MG/DL (ref 8.5–10.1)
CHLORIDE SERPL-SCNC: 109 MMOL/L (ref 94–109)
CHOLEST SERPL-MCNC: 170 MG/DL
CO2 SERPL-SCNC: 26 MMOL/L (ref 20–32)
CREAT SERPL-MCNC: 0.92 MG/DL (ref 0.52–1.04)
GFR SERPL CREATININE-BSD FRML MDRD: 59 ML/MIN/{1.73_M2}
GLUCOSE SERPL-MCNC: 107 MG/DL (ref 70–99)
HDLC SERPL-MCNC: 64 MG/DL
LDLC SERPL CALC-MCNC: 86 MG/DL
Lab: NORMAL
NONHDLC SERPL-MCNC: 106 MG/DL
POTASSIUM SERPL-SCNC: 3.9 MMOL/L (ref 3.4–5.3)
PROT SERPL-MCNC: 6.7 G/DL (ref 6.8–8.8)
SODIUM SERPL-SCNC: 142 MMOL/L (ref 133–144)
SPECIMEN SOURCE: NORMAL
T4 FREE SERPL-MCNC: 1.28 NG/DL (ref 0.76–1.46)
TRIGL SERPL-MCNC: 98 MG/DL
TSH SERPL DL<=0.005 MIU/L-ACNC: 1.38 MU/L (ref 0.4–4)

## 2019-07-12 ASSESSMENT — ANXIETY QUESTIONNAIRES: GAD7 TOTAL SCORE: 3

## 2019-07-12 NOTE — TELEPHONE ENCOUNTER
Pt told Dr. Kendrick she would use my chart.  She has decided she is not going to anymore and would like him to call her when her results for labs and uti test come back.

## 2019-07-12 NOTE — TELEPHONE ENCOUNTER
Pt informed of my chart message. Does not want to use my chart any more. My chart placed on inactive.

## 2019-07-16 ENCOUNTER — TELEPHONE (OUTPATIENT)
Dept: OBGYN | Facility: CLINIC | Age: 79
End: 2019-07-16

## 2019-07-18 ENCOUNTER — TELEPHONE (OUTPATIENT)
Dept: OBGYN | Facility: CLINIC | Age: 79
End: 2019-07-18

## 2019-07-18 NOTE — TELEPHONE ENCOUNTER
Pt saw her Urologist who did not recommend pt be on long term antibiotics since she is not having frequent UTI's-only 1 to 2 per year per pt    Pt would like Dr. Kendrick's recommendation whether she should continue the antibiotic or discontinue     Becca Callaway RN on 7/18/2019 at 8:39 AM

## 2019-07-18 NOTE — TELEPHONE ENCOUNTER
Called pt with Dr Kendrick's input below. Pt verbalized understanding, in agreement with plan, and voiced no further questions.

## 2019-07-18 NOTE — TELEPHONE ENCOUNTER
I think it's worth trying things off the antibiotics. If starts to get UTIs can consider going back but the Urologist has more experience.

## 2020-08-19 NOTE — PROGRESS NOTES
Jaky is a 80 year old  female who presents for annual exam.     Besides routine health maintenance, she has no other health concerns today .    Do you have a Health Care Directive?: Yes, patient states has an Advance Care Planning document and will bring a copy to the clinic.    Fall risk:   Fall Risk Assessment completed per order.    HPI:    Doing well. Sees urologist in [facility]. Off prophylactic antibiotics. Has intermittent hematuria from kidney stones. Had some removed last year.       The patient's PCP is LORENA COMBS.       GYNECOLOGIC HISTORY:  No LMP recorded. Patient is postmenopausal..   reports that she has never smoked. She has never used smokeless tobacco.  Patient is sexually active.  STD testing offered?  Declined     Last PHQ-9 score on record=   PHQ-9 SCORE 2020   PHQ-9 Total Score 0     Last GAD7 score on record=   SOPHIA-7 SCORE 2018   Total Score 9 3 1     Alcohol Score = 1    HEALTH MAINTENANCE:  Cholesterol: followed by primary care provider, found in Care Everywhere  10/18/19   Total= 161, Triglycerides=80, HDL=62, LDL=83, ZDP=269, TSH=1.98  Last Mammo: 19, Result: Normal, Next Mammo: Today   Pap: no further recommended over age 65  DEXA:  16  Spine -1.4, Left Hip -2.0, Right Hip -2.1  Colonoscopy: 3/25/19 (in Care Everywhere, [facility]) , Result: Normal, Next Colonoscopy: 5 years due to history of adenomatous polyps  Health maintenance updated:  yes    HISTORY:  OB History    Para Term  AB Living   2 1 1 0 1 1   SAB TAB Ectopic Multiple Live Births   1 0 0 0 0      # Outcome Date GA Lbr Ruben/2nd Weight Sex Delivery Anes PTL Lv   2 Term            1 SAB              Patient Active Problem List   Diagnosis     Personal history of breast cancer     DVT (deep venous thrombosis) (H)     Past Surgical History:   Procedure Laterality Date     APPENDECTOMY       BREAST LUMPECTOMY, RT/LT Right      CYSTOCELE REPAIR       with mesh     HYSTERECTOMY       KNEE SURGERY       TONSILLECTOMY       WRIST SURGERY        Social History     Tobacco Use     Smoking status: Never Smoker     Smokeless tobacco: Never Used   Substance Use Topics     Alcohol use: Yes     Alcohol/week: 0.0 standard drinks      Problem (# of Occurrences) Relation (Name,Age of Onset)    Breast Cancer (1) Mother    Hyperlipidemia (2) Mother, Sister    Hypertension (2) Mother, Sister    Osteoporosis (1) Mother            Current Outpatient Medications   Medication Sig     AMLODIPINE BESYLATE PO Take 5 mg by mouth     cetirizine (ZYRTEC) 10 MG tablet Take 10 mg by mouth daily     Cholecalciferol (VITAMIN D3 PO) Take by mouth daily     FLUTICASONE PROPIONATE, NASAL, NA      HydrOXYzine HCl (ATARAX OR)      Lactobacillus Acidophilus POWD      levothyroxine (SYNTHROID, LEVOTHROID) 75 MCG tablet Take 75 mcg by mouth daily     metoprolol succinate ER (TOPROL-XL) 25 MG 24 hr tablet Take 25 mg by mouth     PANTOPRAZOLE SODIUM PO      Polyethyl Glycol-Propyl Glycol (SYSTANE OP)      SIMVASTATIN PO Take 20 mg by mouth      venlafaxine (EFFEXOR) 75 MG tablet Take 37.5 mg by mouth 2 times daily Take 1/2 tab morning, 1/2 tab evening.     VITAMIN D, ERGOCALCIFEROL, PO Take 2,000 Units by mouth      XARELTO 20 MG TABS tablet TAKE 1 TAB BY MOUTH EVERY MORNING WITH BREAKFAST.     No current facility-administered medications for this visit.        Allergies   Allergen Reactions     Cefprozil      Stomach.    Other reaction(s): GI intolerance, Intolerance  GI       Ciprofloxacin      Other reaction(s): GI intolerance, Intolerance  GI       Cefadroxil      Codeine Nausea     Other reaction(s): Rash  hallucinations       Erythromycin Swelling and Itching     Other reaction(s): Eye Irritation  Eyes swelled while use the liz       Influenza Vaccine Live Hives     Influenza Vaccines      Morphine Nausea and Vomiting     Amoxicillin Rash     Other reaction(s): Rash       Past medical,  "surgical, social and family history were reviewed and updated in EPIC.    ROS:   12 point review of systems negative other than symptoms noted below or in the HPI.      EXAM:  /74   Ht 1.702 m (5' 7\")   Wt 80.3 kg (177 lb)   BMI 27.72 kg/m     BMI: Body mass index is 27.72 kg/m .    EXAM:  Constitutional: Appearance: Well nourished, well developed alert, in no acute distress  Neck:  Lymph Nodes:  No lymphadenopathy present    Thyroid:  Gland size normal, nontender, no nodules or masses present  on palpation  Chest:  Respiratory Effort:  Breathing unlabored  Cardiovascular:Heart    Auscultation:  Regular rate, normal rhythm, no murmurs present  Breasts: Inspection of Breasts:  No lymphadenopathy present., Palpation of Breasts and Axillae:  No masses present on palpation, no breast tenderness., Axillary Lymph Nodes:  No lymphadenopathy present. and No nodularity, asymmetry or nipple discharge bilaterally.  Gastrointestinal:  Abdominal Examination:  Abdomen nontender to palpation, tone normal without     rigidity or guarding, no masses present, umbilicus without lesions    Liver and speen:  No hepatomegaly present, liver nontender to palpation    Hernias:  No hernias present  Lymphatic: Lymph Nodes:  No other lymphadenopathy present  Skin:  General Inspection:  No rashes present, no lesions present, no areas of  discoloration.    Genitalia and Groin:  No rashes present, no lesions present, no areas of  discoloration, no masses present  Neurologic/Psychiatric:    Mental Status:  Oriented X3     Pelvic Exam:  External Genitalia:     Normal appearance for age, no discharge present, no tenderness present, no inflammatory lesions present, color normal  Vagina:     Normal vaginal vault without central or paravaginal defects, no discharge present, no inflammatory lesions present, no masses present  Bladder:     Nontender to palpation  Urethra:   Urethral Body:  Urethra palpation normal, urethra structural support " normal   Urethral Meatus:  No erythema or lesions present  Cervix:     Surgically absent  Uterus:     Surgically absent  Adnexa:     No adnexal tenderness present, no adnexal masses present  Perineum:     Perineum within normal limits, no evidence of trauma, no rashes or skin lesions present  Anus:     Anus within normal limits, no hemorrhoids present  Inguinal Lymph Nodes:     No lymphadenopathy present  Pubic Hair:     Normal pubic hair distribution for age  Genitalia and Groin:     No rashes present, no lesions present, no areas of discoloration, no masses present      COUNSELING:   Reviewed preventive health counseling, as reflected in patient instructions       Regular exercise    BMI:  Body mass index is 27.72 kg/m .     reports that she has never smoked. She has never used smokeless tobacco.      ASSESSMENT:  80 year old female with satisfactory annual exam.    ICD-10-CM    1. Encounter for gynecological examination without abnormal finding  Z01.419    2. Personal history of breast cancer  Z85.3    3. Acute deep vein thrombosis (DVT) of proximal vein of left lower extremity (H)  I82.4Y2    4. Hematuria, unspecified type  R31.9        PLAN:  Needs Tdap next year.   No changes.   RTC 1 year.    Oskar Kendrick MD

## 2020-08-20 ENCOUNTER — ANCILLARY PROCEDURE (OUTPATIENT)
Dept: MAMMOGRAPHY | Facility: CLINIC | Age: 80
End: 2020-08-20
Attending: OBSTETRICS & GYNECOLOGY
Payer: COMMERCIAL

## 2020-08-20 ENCOUNTER — OFFICE VISIT (OUTPATIENT)
Dept: OBGYN | Facility: CLINIC | Age: 80
End: 2020-08-20
Attending: OBSTETRICS & GYNECOLOGY
Payer: COMMERCIAL

## 2020-08-20 VITALS
SYSTOLIC BLOOD PRESSURE: 118 MMHG | BODY MASS INDEX: 27.78 KG/M2 | WEIGHT: 177 LBS | HEIGHT: 67 IN | DIASTOLIC BLOOD PRESSURE: 74 MMHG

## 2020-08-20 DIAGNOSIS — Z85.3 PERSONAL HISTORY OF BREAST CANCER: ICD-10-CM

## 2020-08-20 DIAGNOSIS — Z01.419 ENCOUNTER FOR GYNECOLOGICAL EXAMINATION WITHOUT ABNORMAL FINDING: Primary | ICD-10-CM

## 2020-08-20 DIAGNOSIS — I82.4Y2 ACUTE DEEP VEIN THROMBOSIS (DVT) OF PROXIMAL VEIN OF LEFT LOWER EXTREMITY (H): ICD-10-CM

## 2020-08-20 DIAGNOSIS — Z12.31 VISIT FOR SCREENING MAMMOGRAM: ICD-10-CM

## 2020-08-20 DIAGNOSIS — R31.9 HEMATURIA, UNSPECIFIED TYPE: ICD-10-CM

## 2020-08-20 PROCEDURE — 77067 SCR MAMMO BI INCL CAD: CPT | Mod: TC

## 2020-08-20 PROCEDURE — 77063 BREAST TOMOSYNTHESIS BI: CPT | Mod: TC

## 2020-08-20 PROCEDURE — 99397 PER PM REEVAL EST PAT 65+ YR: CPT | Performed by: OBSTETRICS & GYNECOLOGY

## 2020-08-20 ASSESSMENT — MIFFLIN-ST. JEOR: SCORE: 1310.5

## 2020-08-20 ASSESSMENT — ANXIETY QUESTIONNAIRES
1. FEELING NERVOUS, ANXIOUS, OR ON EDGE: SEVERAL DAYS
GAD7 TOTAL SCORE: 1
6. BECOMING EASILY ANNOYED OR IRRITABLE: NOT AT ALL
IF YOU CHECKED OFF ANY PROBLEMS ON THIS QUESTIONNAIRE, HOW DIFFICULT HAVE THESE PROBLEMS MADE IT FOR YOU TO DO YOUR WORK, TAKE CARE OF THINGS AT HOME, OR GET ALONG WITH OTHER PEOPLE: NOT DIFFICULT AT ALL
7. FEELING AFRAID AS IF SOMETHING AWFUL MIGHT HAPPEN: NOT AT ALL
3. WORRYING TOO MUCH ABOUT DIFFERENT THINGS: NOT AT ALL
2. NOT BEING ABLE TO STOP OR CONTROL WORRYING: NOT AT ALL
5. BEING SO RESTLESS THAT IT IS HARD TO SIT STILL: NOT AT ALL

## 2020-08-20 ASSESSMENT — PATIENT HEALTH QUESTIONNAIRE - PHQ9
SUM OF ALL RESPONSES TO PHQ QUESTIONS 1-9: 0
5. POOR APPETITE OR OVEREATING: NOT AT ALL

## 2020-08-21 ASSESSMENT — ANXIETY QUESTIONNAIRES: GAD7 TOTAL SCORE: 1

## 2020-12-07 DIAGNOSIS — N39.0 RECURRENT UTI: Primary | ICD-10-CM

## 2020-12-07 RX ORDER — SULFAMETHOXAZOLE/TRIMETHOPRIM 800-160 MG
1 TABLET ORAL DAILY
Qty: 30 TABLET | Refills: 0 | OUTPATIENT
Start: 2020-12-07 | End: 2021-01-06

## 2020-12-07 NOTE — TELEPHONE ENCOUNTER
Called pt to confirm rx request.      Pt stated to ignore refill request.  Pharmacy sent request without consulting the pt.  Pt called pharmacy to inform them this medication has been discontinued.    Rx returned to pharmacy as denied.    Molly Mtz RN on 12/7/2020 at 3:27 PM

## 2020-12-07 NOTE — TELEPHONE ENCOUNTER
Requested Prescriptions   Pending Prescriptions Disp Refills     sulfamethoxazole-trimethoprim (BACTRIM DS) 800-160 MG tablet 30 tablet 0     Sig: Take 1 tablet by mouth daily for 30 doses       There is no refill protocol information for this order        Last Written Prescription Date:  7/11/19  Last Fill Quantity: 90,  # refills: 3   Last office visit: 8/20/2020 with prescribing provider:  Dr Kendrick   Future Office Visit:  None